# Patient Record
Sex: FEMALE | Race: BLACK OR AFRICAN AMERICAN
[De-identification: names, ages, dates, MRNs, and addresses within clinical notes are randomized per-mention and may not be internally consistent; named-entity substitution may affect disease eponyms.]

---

## 2017-02-27 ENCOUNTER — HOSPITAL ENCOUNTER (EMERGENCY)
Dept: HOSPITAL 23 - CED | Age: 32
Discharge: LEFT BEFORE DISCHARGE | End: 2017-02-27
Payer: COMMERCIAL

## 2017-02-27 DIAGNOSIS — J90: Primary | ICD-10-CM

## 2017-02-27 DIAGNOSIS — Z88.1: ICD-10-CM

## 2017-02-27 DIAGNOSIS — C85.90: ICD-10-CM

## 2017-02-27 LAB
BASOPHIL#: 0.1 X10E3 (ref 0–0.3)
BASOPHIL%: 0.5 % (ref 0–2.5)
BLOOD UREA NITROGEN: 8 MG/DL (ref 9–23)
BUN/CREATININE RATIO: 16
CALCIUM SERUM: 9.4 MG/DL (ref 8.4–10.2)
CK MB SERPL-RTO: 17.7 % (ref 11–15.5)
CK MB SERPL-RTO: 31.1 G/DL (ref 30–36)
CREATININE SERUM: 0.5 MG/DL (ref 0.6–1.4)
DIFF IND: NO
EOSINOPHIL#: 0.1 X10E3 (ref 0–0.7)
EOSINOPHIL%: 0.9 % (ref 0–7)
GLOM FILT RATE ESTIMATED: (no result) ML/MIN (ref 60–?)
GLUCOSE FASTING: 96 MG/DL (ref 70–110)
HEMATOCRIT: 33.3 % (ref 35–45)
HEMOGLOBIN: 10.4 GM/DL (ref 12–16)
HIV1+2 AB SPEC QL IA.RAPID: 34.5 SECONDS (ref 23.5–31.3)
INFLUENZA A: (no result)
INFLUENZA B: (no result)
INR: 1.3
KETONES UR QL: 26 MMOL/L (ref 22–31)
KETONES UR QL: 99 MMOL/L (ref 100–111)
LYMPHOCYTE#: 1.8 X10E3 (ref 1–3.5)
LYMPHOCYTE%: 11.9 % (ref 17–45)
MEAN CELL VOLUME: 78.8 FL (ref 83–96)
MEAN CORPUSCULAR HEMOGLOBIN: 24.5 PG (ref 28–34)
MEAN PLATELET VOLUME: 8.5 FL (ref 6.5–11.5)
MONOCYTE#: 1.9 X10E3 (ref 0–1)
MONOCYTE%: 12.6 % (ref 3–12)
NEUTROPHIL#: 11.1 X10E3 (ref 1.5–7.1)
NEUTROPHIL%: 74.1 % (ref 40–75)
PLATELET COUNT: 477 X10E3 (ref 140–420)
POTASSIUM: 3.5 MMOL/L (ref 3.5–5.1)
PROTHROMBIN TIME (PATIENT): 13.5 SECONDS (ref 9.6–11.5)
RED BLOOD COUNT: 4.22 X10E (ref 3.9–5.3)
SODIUM: 135 MMOL/L (ref 135–145)
WHITE BLOOD COUNT: 15 X10E3 (ref 4–10.5)

## 2017-08-09 ENCOUNTER — INITIAL PRENATAL (OUTPATIENT)
Dept: OBSTETRICS AND GYNECOLOGY | Facility: CLINIC | Age: 32
End: 2017-08-09

## 2017-08-09 VITALS
BODY MASS INDEX: 21.53 KG/M2 | HEIGHT: 66 IN | SYSTOLIC BLOOD PRESSURE: 92 MMHG | DIASTOLIC BLOOD PRESSURE: 62 MMHG | WEIGHT: 134 LBS

## 2017-08-09 DIAGNOSIS — Z13.0 ENCOUNTER FOR SICKLE-CELL SCREENING: ICD-10-CM

## 2017-08-09 DIAGNOSIS — O9A.111: Primary | ICD-10-CM

## 2017-08-09 DIAGNOSIS — C81.12 NODULAR SCLEROSIS HODGKIN LYMPHOMA OF INTRATHORACIC LYMPH NODES (HCC): ICD-10-CM

## 2017-08-09 DIAGNOSIS — Z11.3 SCREEN FOR STD (SEXUALLY TRANSMITTED DISEASE): ICD-10-CM

## 2017-08-09 DIAGNOSIS — Z78.9 VARICELLA VACCINATION STATUS UNKNOWN: ICD-10-CM

## 2017-08-09 DIAGNOSIS — Z34.01 ENCOUNTER FOR SUPERVISION OF NORMAL FIRST PREGNANCY IN FIRST TRIMESTER: ICD-10-CM

## 2017-08-09 DIAGNOSIS — Z12.4 SCREENING FOR CERVICAL CANCER: ICD-10-CM

## 2017-08-09 PROCEDURE — 99205 OFFICE O/P NEW HI 60 MIN: CPT | Performed by: OBSTETRICS & GYNECOLOGY

## 2017-08-09 RX ORDER — CHOLECALCIFEROL (VITAMIN D3) 50 MCG
1 TABLET ORAL DAILY
Qty: 30 EACH | Refills: 12 | Status: SHIPPED | OUTPATIENT
Start: 2017-08-09

## 2017-08-09 RX ORDER — MORPHINE SULFATE 15 MG/1
15 TABLET ORAL
COMMUNITY
Start: 2017-07-11

## 2017-08-09 NOTE — PROGRESS NOTES
Initial ob visit     CC- Here for care of pregnancy     Brianne Gregg is being seen today for her first obstetrical visit.  She is a 32 y.o.    12w1d gestation.     #: 1, Date: 98, Sex: Male, Weight: 7 lb 1 oz (3.204 kg), GA: None, Delivery: Vaginal, Spontaneous Delivery, Apgar1: None, Apgar5: None, Living: Yes, Birth Comments: None    #: 2, Date: 11, Sex: Male, Weight: 6 lb (2.722 kg), GA: 37w0d, Delivery: Vaginal, Spontaneous Delivery, Apgar1: None, Apgar5: None, Living: Yes, Birth Comments: None    #: 3, Date: None, Sex: None, Weight: None, GA: None, Delivery: None, Apgar1: None, Apgar5: None, Living: None, Birth Comments: None      Current obstetric complaints : constipation.    Prior obstetric issues, potential pregnancy concerns: Hodgkins Lymphoma  Family history of genetic issues (includes FOB): none  Prior infections concerning in pregnancy (Rash, fever in last 2 weeks): none  Varicella Hx -negative history  Prior testing for Cystic Fibrosis Carrier or Sickle Cell Trait- unknown status  Prepregnancy BMI - Body mass index is 21.63 kg/(m^2).    Past Medical History:   Diagnosis Date   • Abnormal Pap smear of cervix    • Hodgkin's lymphoma        Past Surgical History:   Procedure Laterality Date   • CYSTOSCOPY BLADDER STONE LITHOTRIPSY     • GYNECOLOGIC CRYOSURGERY     • LUNG SURGERY           Current Outpatient Prescriptions:   •  Morphine (MSIR) 15 MG tablet, Take 15 mg by mouth., Disp: , Rfl:     Allergies   Allergen Reactions   • Oxycodone-Acetaminophen Itching   • Amoxicillin Rash       Social History     Social History   • Marital status: Significant Other     Spouse name: N/A   • Number of children: N/A   • Years of education: N/A     Occupational History   • Not on file.     Social History Main Topics   • Smoking status: Former Smoker     Types: Cigarettes   • Smokeless tobacco: Never Used   • Alcohol use No   • Drug use: No   • Sexual activity: Yes     Partners: Male     Other  "Topics Concern   • Not on file     Social History Narrative   • No narrative on file       Family History   Problem Relation Age of Onset   • Breast cancer Neg Hx    • Ovarian cancer Neg Hx    • Uterine cancer Neg Hx    • Colon cancer Neg Hx    • Deep vein thrombosis Neg Hx    • Pulmonary embolism Neg Hx        Review of systems     A comprehensive review of systems was negative.     Objective    BP 92/62  Ht 66\" (167.6 cm)  Wt 134 lb (60.8 kg)  LMP 05/16/2017 (Exact Date)  BMI 21.63 kg/m2      General Appearance:    Alert, cooperative, in no acute distress, habitus normal (appears to have lost weight)    Head:    Normocephalic, without obvious abnormality, atraumatic   Eyes:            Lids and lashes normal, conjunctivae and sclerae normal, no   icterus, no pallor, corneas clear   Ears:    Ears appear intact with no abnormalities noted       Neck:   No adenopathy, supple, trachea midline, no thyromegaly   Back:     No kyphosis present, no scoliosis present,                       Lungs:     Clear to auscultation,respirations regular, even and                   unlabored    Heart:    Regular rhythm and normal rate, normal S1 and S2, no            murmur, no gallop, no rub, no click   Breast Exam:    No masses, No nipple discharge  Left chest wall with large 5 cm mass fixated to wall and above left breast   Abdomen:     Normal bowel sounds, no masses, no organomegaly, soft        non-tender, non-distended, no guarding, no rebound                 tenderness   Genitalia:    Vulva - BUS-WNL, NEFG    Vagina - No discharge, No bleeding    Cervix - No Lesions, closed     Uterus - Consistent with 12 weeks, +FHTs     Adnexa - No kory, NT    Pelvimetry - clinically adequate, gynecoid pelvis     Extremities:   Moves all extremities well, no edema, no cyanosis, no              redness   Pulses:   Pulses palpable and equal bilaterally   Skin:   No bleeding, bruising or rash   Lymph nodes:   No palpable adenopathy "   Neurologic:   Sensation intact, A&O times 3      Assessment    1) Pregnancy at 12w1d  Routine care reviewed.   2) Hodgkin's Lymphoma -  Diagnosed 2 years ago.   No chemotherapy or Radiation ever, has been trying to treat with herbal supplements  Hospitalized last month - rule out complication and released.   Currently in Hospice and only getting pain medication at this time.   Denies currently in pain, so has not been using.   Does have pleural catheter    Oncologist - Does not keep following with - as not involved in care  Discussed 3 thomson issues at this point.     1) Pain medication, if she starts to use more frequently - could have concerns with CINTHYA after delivery   2) If her health deteriorates in response to the cancer, could be issue for fetus   Will have to consider at that point and what is going on with her gestational age.   3) If her health deteriorates and decide to consider Chemo or XRT would have to evaluate from that point.          Plan    Initial labs drawn, GC/CHL screen done  Patient is on Prenatal vitamins  Problem list reviewed and updated.  Reviewed routine prenatal care with the office to include but not limited to   Zika (travel restrictions/ok to use insect repellant), not to changing cat litter, food restrictions, avoidance of alcohol, tobacco and drugs and saunas/hot tubs.   All questions answered.     Bayron Ray MD   8/9/2017  9:52 AM

## 2017-08-10 ENCOUNTER — TELEPHONE (OUTPATIENT)
Dept: OBSTETRICS AND GYNECOLOGY | Facility: CLINIC | Age: 32
End: 2017-08-10

## 2017-08-10 LAB
ABO GROUP BLD: (no result)
BASOPHILS # BLD AUTO: 0 X10E3/UL (ref 0–0.2)
BASOPHILS NFR BLD AUTO: 0 %
BLD GP AB SCN SERPL QL: NEGATIVE
EOSINOPHIL # BLD AUTO: 0.1 X10E3/UL (ref 0–0.4)
EOSINOPHIL NFR BLD AUTO: 0 %
ERYTHROCYTE [DISTWIDTH] IN BLOOD BY AUTOMATED COUNT: 16.7 % (ref 12.3–15.4)
HBV SURFACE AG SERPL QL IA: NEGATIVE
HCT VFR BLD AUTO: 32.2 % (ref 34–46.6)
HGB A MFR BLD: 98 % (ref 94–98)
HGB A2 MFR BLD COLUMN CHROM: 2 % (ref 0.7–3.1)
HGB BLD-MCNC: 9.6 G/DL (ref 11.1–15.9)
HGB C MFR BLD: 0 %
HGB F MFR BLD: 0 % (ref 0–2)
HGB FRACT BLD-IMP: NORMAL
HGB S BLD QL SOLY: NEGATIVE
HGB S MFR BLD: 0 %
HIV 1+2 AB+HIV1 P24 AG SERPL QL IA: NON REACTIVE
IMM GRANULOCYTES # BLD: 0 X10E3/UL (ref 0–0.1)
IMM GRANULOCYTES NFR BLD: 0 %
LYMPHOCYTES # BLD AUTO: 1 X10E3/UL (ref 0.7–3.1)
LYMPHOCYTES NFR BLD AUTO: 9 %
MCH RBC QN AUTO: 24.6 PG (ref 26.6–33)
MCHC RBC AUTO-ENTMCNC: 29.8 G/DL (ref 31.5–35.7)
MCV RBC AUTO: 82 FL (ref 79–97)
MONOCYTES # BLD AUTO: 0.9 X10E3/UL (ref 0.1–0.9)
MONOCYTES NFR BLD AUTO: 8 %
NEUTROPHILS # BLD AUTO: 9.5 X10E3/UL (ref 1.4–7)
NEUTROPHILS NFR BLD AUTO: 83 %
PLATELET # BLD AUTO: 519 X10E3/UL (ref 150–379)
RBC # BLD AUTO: 3.91 X10E6/UL (ref 3.77–5.28)
RH BLD: POSITIVE
RPR SER QL: NON REACTIVE
RUBV IGG SERPL IA-ACNC: 15.1 INDEX
VZV IGG SER IA-ACNC: >4000 INDEX
WBC # BLD AUTO: 11.5 X10E3/UL (ref 3.4–10.8)

## 2017-08-10 NOTE — TELEPHONE ENCOUNTER
----- Message from Bayron Ray MD sent at 8/10/2017  4:16 PM EDT -----  Abril    Yes they can switch.     Thanks   Dr. Ray    ----- Message -----     From: Abril Diaz     Sent: 8/10/2017   3:11 PM       To: MD Barbi Rubio @ [facility] pharmacy states they do not carry PRENATAL MULTIVITAMIN + DHA but they do carry PRENATAL + DHA, pharmacy wanting to know if they switch RX.          Barbi @ [facility] [phone]

## 2017-08-11 LAB
C TRACH RRNA SPEC QL NAA+PROBE: NEGATIVE
CYTOLOGIST CVX/VAG CYTO: NORMAL
CYTOLOGY CVX/VAG DOC THIN PREP: NORMAL
DX ICD CODE: NORMAL
HIV 1 & 2 AB SER-IMP: NORMAL
HPV I/H RISK 1 DNA CVX QL PROBE+SIG AMP: NEGATIVE
N GONORRHOEA RRNA SPEC QL NAA+PROBE: NEGATIVE
OTHER STN SPEC: NORMAL
PATH REPORT.FINAL DX SPEC: NORMAL
STAT OF ADQ CVX/VAG CYTO-IMP: NORMAL
T VAGINALIS RRNA SPEC QL NAA+PROBE: NEGATIVE

## 2017-08-16 ENCOUNTER — HOSPITAL ENCOUNTER (OUTPATIENT)
Dept: ULTRASOUND IMAGING | Facility: HOSPITAL | Age: 32
Discharge: HOME | End: 2017-08-16
Attending: OBSTETRICS & GYNECOLOGY | Admitting: OBSTETRICS & GYNECOLOGY

## 2017-08-16 PROCEDURE — 76801 OB US < 14 WKS SINGLE FETUS: CPT

## 2017-08-16 NOTE — CONSULTS
[facility]  Maternal-Fetal Medicine Consult Note    Dear Dr. Ray:     I saw the above named patient for consultation upon your request due to Hodgkin Lymphoma.     I appreciate you sending a copy of the patient's prenatal record which I reviewed.     I do not know what stage of Hodgkin Disease your patient has although she is only being treated with herbal medications at this time and indicates that she is in Hospice Care.     Both EIF and CPCs were noted.  Too early for complete anatomic assessment.     A fetal anatomical survey and screening cervical length is recommended at about 20 weeks gestation.     The EFW is > 90th %tile.     Cell-free fetal DNA in maternal blood will assess risk for T21, T18, and T13 and X and Y.  Your patient has accepted NIPT which was drawn today.     I recommended that she consider chemotherapy to treat her underlying Hodgkin Lymphoma.     Persistent and ongoing use of opioids will results in CINTHYA following delivery.   consultation as delivery approaches would be appropriate.     Cystic Fibrosis and SMA carrier screening is advised by ACOG and ACMG, and I encourage screening through your office.  Alternatively, consider Expanded Carrier Screening.     Printed information was provided to your patient regarding OMEGA and CPC.     For billing purposes, 30 min spent face-to-face with the patient of which > 50% devoted to patient counseling and coordination of care, exclusive of ultrasound exam.     If you have any questions about the results of this sonogram or my recommendations, please feel free to contact me at any time.     Thank you for referring this patient to the [facility] at [facility].  If you have any questions about the results of this examination or my recommendations, please feel free to contact me at your convenience.     Sincerely yours,    Tristan Patel MD  2017  4:18 PM

## 2017-09-01 ENCOUNTER — DOCUMENTATION (OUTPATIENT)
Dept: OBSTETRICS AND GYNECOLOGY | Facility: HOSPITAL | Age: 32
End: 2017-09-01

## 2017-09-01 NOTE — PROGRESS NOTES
"MFM Note: RE cfDNA screen result    I informed pt of cfDNA result by phone, including the coment \".. results were not consistent with the normal pattern expected.  This result may be suggestive of abnormalities of other chromosomes not tested...\"    Pt has hx Hodgkins lymphoma in 2015.    Differential diagnosis of abnormal cfDNA includes malignancy.     Recommend:  1) MFM appointment to further discuss results with patient  2) Diagnostic amniocentesis to exclude fetal aneuploidy   3) Medical genetics consultation  4) Oncology consultation  "

## 2017-09-06 ENCOUNTER — TELEPHONE (OUTPATIENT)
Dept: OBSTETRICS AND GYNECOLOGY | Facility: CLINIC | Age: 32
End: 2017-09-06

## 2017-09-06 ENCOUNTER — ROUTINE PRENATAL (OUTPATIENT)
Dept: OBSTETRICS AND GYNECOLOGY | Facility: CLINIC | Age: 32
End: 2017-09-06

## 2017-09-06 VITALS — BODY MASS INDEX: 23.08 KG/M2 | WEIGHT: 143 LBS | DIASTOLIC BLOOD PRESSURE: 69 MMHG | SYSTOLIC BLOOD PRESSURE: 105 MMHG

## 2017-09-06 DIAGNOSIS — O28.9 ABNORMAL ANTENATAL TEST: ICD-10-CM

## 2017-09-06 DIAGNOSIS — Z34.82 ENCOUNTER FOR SUPERVISION OF OTHER NORMAL PREGNANCY IN SECOND TRIMESTER: ICD-10-CM

## 2017-09-06 DIAGNOSIS — O9A.112 CANCER COMPLICATING PREGNANCY, SECOND TRIMESTER: Primary | ICD-10-CM

## 2017-09-06 PROCEDURE — 99214 OFFICE O/P EST MOD 30 MIN: CPT | Performed by: OBSTETRICS & GYNECOLOGY

## 2017-09-06 NOTE — PROGRESS NOTES
OB follow up     Brianne Gregg is a 32 y.o.  16w1d being seen today for her obstetrical visit.  Patient reports headache. Fetal movement: absent.    Review of Systems  No bleeding, No cramping/contractions     /69  Wt 143 lb (64.9 kg)  LMP 2017 (Exact Date)  BMI 23.08 kg/m2    FHT: 164 BPM   Uterine Size: size equals dates       Assessment    1) pregnancy at 16w1d   Labs and ultrasound reviewed. See issues below  2) Untreated advanced hodkin's lymphoma   Patient with several year history of cancer, for which she has refused conventional treatment to the point that she is just using Herbs.  She moved from [facility] to [place] and was found to be pregnant in the last few months.  This has NOT to this point changed her interest in treating her cancer.  (Based on our recent discussion) - I have reviewed her with my partners, as they could be called in to deal with her while they are covering.  All have stressed this is beyond what we typically cover and could present with a large number of complex and ethical problems they would not be comfortable with - for instance if she deteriorates around the time of viability, or if she develops other medical complications of her advanced cancer. I informed her of this today and explained I would talk with Dr. Hansen who she conversed with recently because of another issue and see about [facility] taking her on.    3) Abnormal cell free DNA test.  Not high risk for down syndrome, but felt to not be normal.    Dr. Hansen put note in his file about this, I believe he suggested.   1) Amniocentesis to see specifically what fetal karotype is   2) Refer her to Hem Onc for further discussion.     I have talked to Dr. Hansen who wants her to follow up with [facility]  We will fax records to [phone] attention Carmelita Robb RN to help facilitate her care.     I spent 20 of 30 minutes face to face with the patient, during this time we reviewed her medical diagnosis  and our treatment concerns as outlined above as well as talked directly with Dr. Hansen about accepting this patient.            Plan    Reviewed this stage of pregnancy  Problem list updated   Follow up needs to be arranged, see above.     Bayron Ray MD   9/6/2017  9:00 AM

## 2017-09-06 NOTE — TELEPHONE ENCOUNTER
----- Message from Bayron Ray MD sent at 9/6/2017 10:37 AM EDT -----  Alysha, known abnormal cell free DNA test. We have referred her on due to multiple issues to  - [facility].  Thanks Dr. Ray

## 2017-09-06 NOTE — TELEPHONE ENCOUNTER
----- Message from Bayron Ray MD sent at 9/6/2017 10:15 AM EDT -----  We need to fax her records to  at  [facility], [phone] attention Carmelita Robb, RN -  I spoke to Dr. Maximiliano Hansen who has agreed for the residents to take her on.